# Patient Record
Sex: FEMALE | Race: WHITE | NOT HISPANIC OR LATINO | Employment: FULL TIME | URBAN - METROPOLITAN AREA
[De-identification: names, ages, dates, MRNs, and addresses within clinical notes are randomized per-mention and may not be internally consistent; named-entity substitution may affect disease eponyms.]

---

## 2023-08-02 ENCOUNTER — OFFICE VISIT (OUTPATIENT)
Dept: VASCULAR SURGERY | Facility: CLINIC | Age: 58
End: 2023-08-02
Payer: COMMERCIAL

## 2023-08-02 VITALS
BODY MASS INDEX: 34.65 KG/M2 | SYSTOLIC BLOOD PRESSURE: 130 MMHG | WEIGHT: 242 LBS | HEART RATE: 72 BPM | HEIGHT: 70 IN | DIASTOLIC BLOOD PRESSURE: 90 MMHG | OXYGEN SATURATION: 100 %

## 2023-08-02 DIAGNOSIS — I83.819 VARICOSE VEINS OF UNSPECIFIED LOWER EXTREMITY WITH PAIN: Primary | ICD-10-CM

## 2023-08-02 PROCEDURE — 99203 OFFICE O/P NEW LOW 30 MIN: CPT | Performed by: NURSE PRACTITIONER

## 2023-08-02 RX ORDER — ASPIRIN 81 MG/1
TABLET ORAL
COMMUNITY

## 2023-08-02 RX ORDER — CETIRIZINE HYDROCHLORIDE 5 MG/1
TABLET ORAL
COMMUNITY

## 2023-08-02 RX ORDER — CHOLECALCIFEROL (VITAMIN D3) 1250 MCG
CAPSULE ORAL
COMMUNITY
Start: 2023-05-23

## 2023-08-02 NOTE — PROGRESS NOTES
Assessment/Plan:    Varicose veins of unspecified lower extremity with pain  47-year-old female new referral for b/l varicose vein with pain. Reports she has concerning 'lump' at the right leg medial ankle area. This area is non-tender, no erythremia, no brusing, no evident varicose vein at the site. She additionally reports she has b/l leg heaviness, tiredness and achyness in her legs that has been worsening for the last 6 months. -Reports she has been wearing b/l leg compression daily for the last several years with only minimal relief. She does leg elevation throughout the day. -No hx of vein surgeries.  -Denies hx of DVT  -medium sized varicosities R>L with spider telangiectasia of the lower legs. See clinical photos. -Reviewed pathophysiology of venous insuffiencey and varicose veins and treatment with continued treatment measures with leg compression, elevation and exercise. Will order LEVDR to determine if pt is a candidates for EVLT. Pt verbalized understanding and is agreeable to this plan. Recommendations   -Complete b/l LEVDR in 1 month and return to the office for review with a vascular surgeon.   -Continue wearing compression. Discussed increasing strength of compression if able to tolerate. RX given today.  -Leg elevation. Goal of 3-4 times a day 15 minutes at a time.  -Increase exercise and ambulation. Goal of 3-4 times a week for 30 min. -Apply moisturizing cream to the b/l legs to maintain skin integrity and prevent breakdown.  -Call the office with any new or worsening symptoms. Diagnoses and all orders for this visit:    Varicose veins of unspecified lower extremity with pain  -     Ambulatory Referral to Vascular Surgery  -     VAS reflux lower limb venous duplex study with reflux assessment, complete bilateral; Future  -     Compression Stocking    Other orders  -     Multiple Vitamin (MULTI-VITAMIN DAILY PO);  Take 1 tablet by mouth daily  -     aspirin (ECOTRIN LOW STRENGTH) 81 mg EC tablet; Take by mouth  -     cetirizine (ZyrTEC) 5 MG tablet; Take 1 tab as needed  -     Cholecalciferol (Vitamin D3) 1.25 MG (53492 UT) CAPS; TAKE 1 CAPSULE BY MOUTH EVERY WEEK NOT COVERED BY INSURANCE  -     Cholecalciferol 50 MCG (2000 UT) CAPS; Take 1 capsule by mouth daily  -     cyanocobalamin (VITAMIN B-12) 1000 MCG tablet; Take 1,000 mcg by mouth daily          Subjective:      Patient ID: Alice Bejarano is a 66 y.o. female. Patient is new to our office. She was referred here by Cliff Barrera, [address] [place] [address]. Patient c/o bulging, painful veins that causes her both legs to feel tired and heavy. Patient is taking ASA 81 mg. She is a non-smoker. 51-year-old female with minimal pmhx new to the network new referral to the office for b/l varicose veins with pain. C/O R medial ankle 'lump' that is non-tender, no erythremia, no bruising. Reports that it has been there for years and would like to investigate this area further. Denies pain with walking. Reports heaviness, tiredness and achene in b/l gets that has been getting worse over the last 6 months. Leg swelling by the end of the day. Reports she wears knee high compression for many years with only minimal relief. She has been doing leg elevation, this does give her some relief. She does some walking, biking and boxing for exercise.   -Denies claudication, rest pain, wounds/ or tissue loss. Denies any vascular sx in the past   Denies and DVT/ clot in the past.    Takes ASA 81mg daily for family history of father and brother have a hx of DVT and PE. The following portions of the patient's history were reviewed and updated as appropriate: allergies, current medications, past family history, past medical history, past social history, past surgical history and problem list.    Review of Systems   Constitutional: Negative. HENT: Negative. Eyes: Negative. Respiratory: Negative. Negative for shortness of breath.     Cardiovascular: Negative for chest pain and leg swelling. Painful veins   Gastrointestinal: Negative. Endocrine: Negative. Genitourinary: Negative. Musculoskeletal: Negative. Skin: Negative. Allergic/Immunologic: Negative. Neurological: Negative. Hematological: Negative. Psychiatric/Behavioral: Negative. Objective:      /90 (BP Location: Left arm, Patient Position: Sitting, Cuff Size: Large)   Pulse 72   Ht 5' 10" (1.778 m)   Wt 110 kg (242 lb)   SpO2 100%   BMI 34.72 kg/m²          Physical Exam  Vitals and nursing note reviewed. Constitutional:       Appearance: Normal appearance. HENT:      Head: Normocephalic and atraumatic. Cardiovascular:      Rate and Rhythm: Normal rate. Pulses: Normal pulses. Dorsalis pedis pulses are 2+ on the right side and 2+ on the left side. Posterior tibial pulses are 2+ on the right side and 2+ on the left side. Heart sounds: Normal heart sounds. Pulmonary:      Effort: Pulmonary effort is normal.      Breath sounds: Normal breath sounds. Musculoskeletal:      Right lower leg: No edema. Left lower leg: No edema. Skin:     General: Skin is warm and dry. Capillary Refill: Capillary refill takes less than 2 seconds. Neurological:      General: No focal deficit present. Mental Status: She is alert and oriented to person, place, and time. Psychiatric:         Mood and Affect: Mood normal.         Behavior: Behavior normal.           I have reviewed and made appropriate changes to the review of systems input by the medical assistant.                     Vitals:    08/02/23 0842   BP: 130/90   BP Location: Left arm   Patient Position: Sitting   Cuff Size: Large   Pulse: 72   SpO2: 100%   Weight: 110 kg (242 lb)   Height: 5' 10" (1.778 m)       Patient Active Problem List   Diagnosis   • Varicose veins of unspecified lower extremity with pain       Past Surgical History:   Procedure Laterality Date • PARTIAL HYSTERECTOMY N/A 2011       History reviewed. No pertinent family history.     Social History     Socioeconomic History   • Marital status: /Civil Union     Spouse name: Not on file   • Number of children: Not on file   • Years of education: Not on file   • Highest education level: Not on file   Occupational History   • Not on file   Tobacco Use   • Smoking status: Never   • Smokeless tobacco: Never   Vaping Use   • Vaping Use: Never used   Substance and Sexual Activity   • Alcohol use: Yes     Comment: socially   • Drug use: Never   • Sexual activity: Not on file   Other Topics Concern   • Not on file   Social History Narrative   • Not on file     Social Determinants of Health     Financial Resource Strain: Not on file   Food Insecurity: Not on file   Transportation Needs: Not on file   Physical Activity: Not on file   Stress: Not on file   Social Connections: Not on file   Intimate Partner Violence: Not on file   Housing Stability: Not on file       Allergies   Allergen Reactions   • Penicillins Hives     rash  Other reaction(s): rash, hives     • Sulfa Antibiotics Hives     rash  Other reaction(s): rash, hives           Current Outpatient Medications:   •  aspirin (ECOTRIN LOW STRENGTH) 81 mg EC tablet, Take by mouth, Disp: , Rfl:   •  cetirizine (ZyrTEC) 5 MG tablet, Take 1 tab as needed, Disp: , Rfl:   •  Cholecalciferol (Vitamin D3) 1.25 MG (35080 UT) CAPS, TAKE 1 CAPSULE BY MOUTH EVERY WEEK NOT COVERED BY INSURANCE, Disp: , Rfl:   •  Cholecalciferol 50 MCG (2000 UT) CAPS, Take 1 capsule by mouth daily, Disp: , Rfl:   •  cyanocobalamin (VITAMIN B-12) 1000 MCG tablet, Take 1,000 mcg by mouth daily, Disp: , Rfl:   •  Multiple Vitamin (MULTI-VITAMIN DAILY PO), Take 1 tablet by mouth daily, Disp: , Rfl:   I have spent a total time of 30 minutes on 08/02/23 in caring for this patient including Risks and benefits of tx options, Instructions for management, Patient and family education, Importance of tx compliance, Risk factor reductions, Counseling / Coordination of care, Documenting in the medical record and Obtaining or reviewing history  .

## 2023-08-02 NOTE — PATIENT INSTRUCTIONS
- Call the office if you experience any changes to your legs or feet such as new pain, redness or swelling.    - Stay active. Exercise everyday. Walking is the recommended exercise with a goal of 30 min 3-4 times a week. A healthy weight can assist in decreasing varicose vein symptoms.     - Wear Compression. Put them on in the morning, wear all day and take off before bed at night.     -Elevate your legs above the level of your heart. Elevate for 15 minutes 3-4 times a day. -When looking at buying compression, look for "gradient compression" with a weight 20-30mmHg (medium weight), knee high is fine.  -Try "[email or web address]"    -A good brand is Sigvaris, soft opaque, knee high.

## 2023-08-02 NOTE — ASSESSMENT & PLAN NOTE
82-year-old female new referral for b/l varicose vein with pain. Reports she has concerning 'lump' at the right leg medial ankle area. This area is non-tender, no erythremia, no brusing, no evident varicose vein at the site. She additionally reports she has b/l leg heaviness, tiredness and achyness in her legs that has been worsening for the last 6 months. -Reports she has been wearing b/l leg compression daily for the last several years with only minimal relief. She does leg elevation throughout the day. -No hx of vein surgeries.  -Denies hx of DVT  -medium sized varicosities R>L with spider telangiectasia of the lower legs. See clinical photos. -Reviewed pathophysiology of venous insuffiencey and varicose veins and treatment with continued treatment measures with leg compression, elevation and exercise. Will order LEVDR to determine if pt is a candidates for EVLT. Pt verbalized understanding and is agreeable to this plan. Recommendations   -Complete b/l LEVDR in 1 month and return to the office for review with a vascular surgeon.   -Continue wearing compression. Discussed increasing strength of compression if able to tolerate. RX given today.  -Leg elevation. Goal of 3-4 times a day 15 minutes at a time.  -Increase exercise and ambulation. Goal of 3-4 times a week for 30 min. -Apply moisturizing cream to the b/l legs to maintain skin integrity and prevent breakdown.  -Call the office with any new or worsening symptoms.

## 2023-09-14 ENCOUNTER — HOSPITAL ENCOUNTER (OUTPATIENT)
Dept: NON INVASIVE DIAGNOSTICS | Facility: CLINIC | Age: 58
Discharge: HOME | End: 2023-09-14
Payer: COMMERCIAL

## 2023-09-14 DIAGNOSIS — I83.819 VARICOSE VEINS OF UNSPECIFIED LOWER EXTREMITY WITH PAIN: ICD-10-CM

## 2023-09-14 PROCEDURE — 93970 EXTREMITY STUDY: CPT

## 2023-09-15 PROCEDURE — 93970 EXTREMITY STUDY: CPT | Performed by: SURGERY

## 2023-09-27 ENCOUNTER — OFFICE VISIT (OUTPATIENT)
Dept: VASCULAR SURGERY | Facility: CLINIC | Age: 58
End: 2023-09-27
Payer: COMMERCIAL

## 2023-09-27 VITALS
BODY MASS INDEX: 34.07 KG/M2 | DIASTOLIC BLOOD PRESSURE: 78 MMHG | OXYGEN SATURATION: 99 % | WEIGHT: 238 LBS | SYSTOLIC BLOOD PRESSURE: 112 MMHG | HEIGHT: 70 IN | HEART RATE: 71 BPM

## 2023-09-27 DIAGNOSIS — I83.819 VARICOSE VEINS OF UNSPECIFIED LOWER EXTREMITY WITH PAIN: ICD-10-CM

## 2023-09-27 DIAGNOSIS — I87.2 VENOUS INSUFFICIENCY OF BOTH LOWER EXTREMITIES: Primary | ICD-10-CM

## 2023-09-27 PROCEDURE — 99214 OFFICE O/P EST MOD 30 MIN: CPT | Performed by: SURGERY

## 2023-09-27 NOTE — ASSESSMENT & PLAN NOTE
Dannielle Ha returns to the office to review results of her venous reflux study. She has bilateral deep venous incompetence with bilateral small saphenous vein incompetence. Does have focal segmental left greater saphenous vein incompetence. She does have some varicosities along the right posterior lateral leg. She complains of pain and heaviness bilaterally. I believe her symptoms are primarily related to her deep venous incompetence which unfortunately is not amenable to intervention. Recommend continued compression stockings leg elevation, regular exercise and maintenance of healthy weight. Patient may follow-up on an as-needed basis.

## 2023-09-27 NOTE — PROGRESS NOTES
Assessment/Plan:    Venous insufficiency of both lower extremities  Henry Ojeda returns to the office to review results of her venous reflux study. She has bilateral deep venous incompetence with bilateral small saphenous vein incompetence. Does have focal segmental left greater saphenous vein incompetence. She does have some varicosities along the right posterior lateral leg. She complains of pain and heaviness bilaterally. I believe her symptoms are primarily related to her deep venous incompetence which unfortunately is not amenable to intervention. Recommend continued compression stockings leg elevation, regular exercise and maintenance of healthy weight. Patient may follow-up on an as-needed basis. Diagnoses and all orders for this visit:    Venous insufficiency of both lower extremities    Varicose veins of unspecified lower extremity with pain          Subjective:      Patient ID: Iline Rubinstein is a 66 y.o. female. Patient is here today to review results of a LEVDR done 9/14/2023. Patient c/o bulging painful veins in her right leg that causes her leg to feel tired and heavy. Patient has been wearing her compression stockings daily. She denies any bleeding veins. She is taking ASA 81 mg. She is a non-smoker. James You returns to office to review results of reflux study and reassess symptoms. The following portions of the patient's history were reviewed and updated as appropriate: allergies, current medications, past family history, past medical history, past social history, past surgical history and problem list.    Review of Systems   Constitutional: Negative. HENT: Negative. Eyes: Negative. Respiratory: Negative. Cardiovascular:        Painful veins   Gastrointestinal: Negative. Endocrine: Negative. Genitourinary: Negative. Musculoskeletal: Negative. Skin: Negative. Allergic/Immunologic: Negative. Neurological: Negative. Hematological: Negative. Psychiatric/Behavioral: Negative. Objective:      /78 (BP Location: Left arm, Patient Position: Sitting, Cuff Size: Standard)   Pulse 71   Ht 5' 10" (1.778 m)   Wt 108 kg (238 lb)   SpO2 99%   BMI 34.15 kg/m²          Physical Exam  Constitutional:       General: She is not in acute distress. Appearance: She is well-developed. HENT:      Head: Normocephalic and atraumatic. Eyes:      General: No scleral icterus. Conjunctiva/sclera: Conjunctivae normal.   Neck:      Trachea: No tracheal deviation. Cardiovascular:      Rate and Rhythm: Normal rate. Pulmonary:      Effort: Pulmonary effort is normal.   Abdominal:      General: There is no distension. Palpations: Abdomen is soft. There is no mass (no appreciable aortic pulsation/aneurysm). Tenderness: There is no abdominal tenderness. There is no guarding or rebound. Musculoskeletal:         General: Normal range of motion. Cervical back: Normal range of motion and neck supple. Skin:     General: Skin is warm and dry. Comments: Varicosities to posterolateral right leg   Neurological:      Mental Status: She is alert and oriented to person, place, and time. Psychiatric:         Behavior: Behavior normal.             Reflux study:  CONCLUSION:     Impression:     RIGHT LIMB:  Deep venous incompetence is noted in the distal femoral vein. The great saphenous vein is competent. The great saphenous vein remains within the saphenous compartment in the thigh. Multiple branches noted. The small saphenous vein is incompetent and directly communicates with the  popliteal vein. There is a venous branch that appears to lead into the varicose  vein that travels down her lateral calf. Non-occlusive chronic superficial thrombophlebitis is noted in the small  saphenous vein at the knee. There is no evidence of incompetent perforators in the thigh or calf.   There is no evidence of deep vein thrombosis in the CFV, the proximal PFV, the  femoral vein and the popliteal vein. LEFT LIMB:  Deep venous incompetence is noted in the femoral and deep femoral veins. The great saphenous vein is incompetent. The great saphenous vein remains within the saphenous compartment in the thigh. Multiple branches noted. Anterior accessory saphenous vein is only visualized in the proximal thigh and  is patent. The small saphenous vein is incompetent and communicates with the popliteal  vein via a . There is no evidence of incompetent perforators in the thigh or calf. There is no evidence of deep vein thrombosis in the CFV, the proximal PFV, the  femoral vein and the popliteal vein.